# Patient Record
(demographics unavailable — no encounter records)

---

## 2024-12-18 NOTE — HISTORY OF PRESENT ILLNESS
[Postpartum Follow Up] : postpartum follow up [Delivery Date: ___] : on [unfilled] [] : delivered by vaginal delivery [Female] : Delivery History: baby girl [Wt. ___] : weighing [unfilled] [Breastfeeding] : currently nursing [Discharge HCT: ___] : hematocrit level was [unfilled] [Discharge HGB: ___] : hemoglobin level was [unfilled] [Resumed Vicco] : has resumed intercourse [Intended Contraception] : Intended Contraception: [Last Pap Date: ___] : Last Pap Date: [unfilled] [Rhogam] : Rhogam was not administered [Rubella Vaccine] : Rubella vaccine was not administered [Pertussis Vaccine] : Pertussis vaccine was not administered [BTL] : no tubal ligation [Resumed Menses] : has not resumed her menses [IUD] : intrauterine device [Back to Normal] : is back to normal in size [Examination Of The Breasts] : breasts are normal [Doing Well] : is doing well [No Sign of Infection] : is showing no signs of infection [Excellent Pain Control] : has excellent pain control [None] : None [FreeTextEntry8] : Breastfeeding / Sexually active, not using protection, requesting IUD for contraception. [de-identified] : Postpartum exam / Breastfeeding / Contraception  [de-identified] : STD screening sent - pt will RTO for Mirena IUD insertion after counseling today; (+)Breastfeeding - encouraged to take PNV daily - renewed rx for 1 year.

## 2025-01-08 NOTE — PLAN
[FreeTextEntry1] : Contraception counseling - RTO for Mirena IUD insertion / Strict avoidance of sex until f/u procedure date.

## 2025-01-08 NOTE — HISTORY OF PRESENT ILLNESS
[FreeTextEntry1] : Pt presents for IUD insertion:  Patient has had unprotected sex since postpartum visit, POCT is negative today.   (+)Breastfeeding.  Pt made aware even though POCT is negative today, she may still be early pregnant, will have to reschedule her IUD insertion procedure for 2 weeks later, advised to avoid sexual intercourse until f/u visit for IUD insertion.   Aptima(12/18/24) Negative

## 2025-01-22 NOTE — PLAN
[FreeTextEntry1] : RTO 6 weeks for IUD string check / No vaginal intercourse for 2 weeks post-IUD insertion.

## 2025-01-22 NOTE — PROCEDURE
[IUD Placement] : intrauterine device (IUD) placement [Consent Obtained] : Consent obtained [Prevention of Pregnancy] : prevention of pregnancy [Risks] : risks [Benefits] : benefits [Alternatives] : alternatives [Patient] : patient [Infection] : infection [Bleeding] : bleeding [Pain] : pain [Expulsion] : expulsion [Neg Pregnancy Test] : negative pregnancy test [Neg GC/Chlamydia] : negative GC/Chlamydia [No Premedication] : No premedication [Tenaculum] : Tenaculum [Easy Passage] : Easy passage [Sounded to ___ cm] : sounded to [unfilled] ~Ucm [Mirena IUD] : Mirena IUD [Tolerated Well] : Patient tolerated the procedure well [No Complications] : No complications [None] : None [de-identified] : [id number] [de-identified] : 02/2027 [de-identified] : 01/2032

## 2025-03-05 NOTE — PHYSICAL EXAM
[Chaperone Present] : A chaperone was present in the examining room during all aspects of the physical examination [Appropriately responsive] : appropriately responsive [Alert] : alert [No Acute Distress] : no acute distress [Oriented x3] : oriented x3 [Labia Majora] : normal [Labia Minora] : normal [IUD String] : an IUD string was noted [Normal] : normal [Uterine Adnexae] : normal [FreeTextEntry5] : ~ 2 cm in length extending out from cervical os

## 2025-03-05 NOTE — HISTORY OF PRESENT ILLNESS
[FreeTextEntry1] : s/p Mirena IUD insertion (01/22/25) presents for IUD check.  Denies spotting, no issues with sexual intercourse w/ after IUD inserted.  LMP -non since pt is breastfeeding and currently with Mirena IUD.   Breastfeeding - taking supplement daily  Last Pap(04/03/24) Neg / (-) HPV

## 2025-06-04 NOTE — HISTORY OF PRESENT ILLNESS
[FreeTextEntry1] : Annual Gyn exam  No Gyn complaints today, presents for well woman visit.   Complaining of vulvovaginal irritation x 3 days / pelvic pain x 3 days / abnormal vaginal discharge and odor x 3 days / urinary symptoms x 3 days.  LMP(25) Menarche~12 / q 28 days / 5-7 days - starting light to normal quantity and duration / experiencing heavy periods monthly w/clots.  Last Pap(24) Neg / (-) HPV ; Gardasil vaccine counseling provided, pt declines history of obtaining vaccine, requesting initial dose today; Patient has obtained Gardasil series vaccine previously with former Gynecologist.  Last Mammogram (224) BIRAD#2 ; Referred by PCP - WNL / Requesting annual screening referral today.    / s/p  x 2   followed by C/S x 2 (2023-M/F) / (25-M/F) / STOP x 1 / ETOP x 1 - Denies history of bladder dysfunction.  Single /  /  / Living w/partner ; Never sexually active / Not sexually active > 5 years / Sexually active w/partner/, using OCP/condoms/withdrawal method/BTL for contraception / declines hormonal contraception,  content with current method.  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

## 2025-06-04 NOTE — PHYSICAL EXAM
[Chaperoned Physical Exam] : A chaperone was present in the examining room during all aspects of the physical examination. [MA] : MA [FreeTextEntry1] : lorena [Appropriately responsive] : appropriately responsive [Alert] : alert [No Acute Distress] : no acute distress [No Lymphadenopathy] : no lymphadenopathy [Regular Rate Rhythm] : regular rate rhythm [No Murmurs] : no murmurs [Clear to Auscultation B/L] : clear to auscultation bilaterally [Soft] : soft [Non-tender] : non-tender [Non-distended] : non-distended [No HSM] : No HSM [No Lesions] : no lesions [No Mass] : no mass [Oriented x3] : oriented x3 [Examination Of The Breasts] : a normal appearance [No Masses] : no breast masses were palpable [Labia Majora] : normal [Labia Minora] : normal [IUD String] : an IUD string was noted [Normal] : normal [Uterine Adnexae] : normal

## 2025-06-04 NOTE — PLAN
[FreeTextEntry1] : Annual gyn exam - Pap/HPV/STD sent / Pap smear up to date; STD screen - Pt requested STD blood screen today; Acute / Subacute Vaginitis - Aptima sent - treat based on results, pt aware; (+) Urinary symptoms - Urine Culture sent - treat based on results, pt aware; Annual screening Mammogram referral given or up to date; History of Irregular cycles / Pelvic pain - Hormone  panel / Health maintenance panel ordered today / Referral for Pelvic U/S given; Contraception counseling provided - Rx Depo w/ Calcium and Vitamin D daily sent to pharmacy / Rx Apri - 11 refills sent to pharmacy; History of Bladder dysfunction - Urogynecology consult referral provided today / Daily Kegel's exercise reviewed; (+) Postmenopausal - Counseling provided / Healthy dietary lifestyle modifications / Daily weight bearing exercises / Daily Yoga stretches and Mental Health exercises reviewed;  RTO for results review.